# Patient Record
Sex: FEMALE | Race: BLACK OR AFRICAN AMERICAN | URBAN - METROPOLITAN AREA
[De-identification: names, ages, dates, MRNs, and addresses within clinical notes are randomized per-mention and may not be internally consistent; named-entity substitution may affect disease eponyms.]

---

## 2021-01-13 ENCOUNTER — TELEPHONE ENCOUNTER (OUTPATIENT)
Dept: URBAN - METROPOLITAN AREA CLINIC 113 | Facility: CLINIC | Age: 66
End: 2021-01-13

## 2021-03-16 ENCOUNTER — OFFICE VISIT (OUTPATIENT)
Dept: URBAN - METROPOLITAN AREA CLINIC 107 | Facility: CLINIC | Age: 66
End: 2021-03-16
Payer: MEDICARE

## 2021-03-16 ENCOUNTER — WEB ENCOUNTER (OUTPATIENT)
Dept: URBAN - METROPOLITAN AREA CLINIC 107 | Facility: CLINIC | Age: 66
End: 2021-03-16

## 2021-03-16 VITALS
RESPIRATION RATE: 18 BRPM | WEIGHT: 211 LBS | DIASTOLIC BLOOD PRESSURE: 73 MMHG | BODY MASS INDEX: 36.02 KG/M2 | SYSTOLIC BLOOD PRESSURE: 124 MMHG | HEIGHT: 64 IN | HEART RATE: 81 BPM | TEMPERATURE: 97.1 F

## 2021-03-16 DIAGNOSIS — R16.0 HEPATOMEGALY: ICD-10-CM

## 2021-03-16 DIAGNOSIS — K21.9 GASTROESOPHAGEAL REFLUX DISEASE, UNSPECIFIED WHETHER ESOPHAGITIS PRESENT: ICD-10-CM

## 2021-03-16 DIAGNOSIS — R10.11 RUQ PAIN: ICD-10-CM

## 2021-03-16 PROCEDURE — 99244 OFF/OP CNSLTJ NEW/EST MOD 40: CPT | Performed by: INTERNAL MEDICINE

## 2021-03-16 PROCEDURE — 99204 OFFICE O/P NEW MOD 45 MIN: CPT | Performed by: INTERNAL MEDICINE

## 2021-03-16 RX ORDER — AMLODIPINE BESYLATE 5 MG/1
1 TABLET TABLET ORAL ONCE A DAY
Status: ACTIVE | COMMUNITY

## 2021-03-16 RX ORDER — ESCITALOPRAM 20 MG/1
1 TABLET TABLET, FILM COATED ORAL ONCE A DAY
Status: ACTIVE | COMMUNITY

## 2021-03-16 RX ORDER — LEVOTHYROXINE SODIUM 0.03 MG/1
1 TABLET IN THE MORNING ON AN EMPTY STOMACH TABLET ORAL ONCE A DAY
Status: ACTIVE | COMMUNITY

## 2021-03-16 NOTE — HPI-TODAY'S VISIT:
66 y/o female with RUQ pain thats intermittent in nature. She has had this pain for > 2 years. It starts in her RUQ and sometimes radiates to the back. She does still have her GB. She was a previous smoker and drinks a beer occcasionally but stopped. She denies any nausea/emesis. She does have GERD. She does take OTC nexium and sometimes it helps.   She has had a colonoscopy last month by Dr. Mooney but was never treated for this pain by him. Her colonoscopy was positive for 2 polyps  and she was told to repeat in 5 years.

## 2021-05-12 ENCOUNTER — OFFICE VISIT (OUTPATIENT)
Dept: URBAN - METROPOLITAN AREA CLINIC 107 | Facility: CLINIC | Age: 66
End: 2021-05-12
Payer: MEDICARE

## 2021-05-12 VITALS
SYSTOLIC BLOOD PRESSURE: 147 MMHG | HEIGHT: 64 IN | DIASTOLIC BLOOD PRESSURE: 74 MMHG | TEMPERATURE: 98.1 F | WEIGHT: 214 LBS | HEART RATE: 82 BPM | RESPIRATION RATE: 18 BRPM | BODY MASS INDEX: 36.54 KG/M2

## 2021-05-12 DIAGNOSIS — R16.0 HEPATOMEGALY: ICD-10-CM

## 2021-05-12 DIAGNOSIS — R10.11 RUQ PAIN: ICD-10-CM

## 2021-05-12 DIAGNOSIS — K21.9 GASTROESOPHAGEAL REFLUX DISEASE, UNSPECIFIED WHETHER ESOPHAGITIS PRESENT: ICD-10-CM

## 2021-05-12 PROCEDURE — 99213 OFFICE O/P EST LOW 20 MIN: CPT | Performed by: INTERNAL MEDICINE

## 2021-05-12 RX ORDER — AMLODIPINE BESYLATE 5 MG/1
1 TABLET TABLET ORAL ONCE A DAY
Status: ACTIVE | COMMUNITY

## 2021-05-12 RX ORDER — BUPROPION HYDROCHLORIDE 150 MG/1
1 TABLET IN THE MORNING TABLET, FILM COATED ORAL ONCE A DAY
Status: ACTIVE | COMMUNITY

## 2021-05-12 RX ORDER — LEVOTHYROXINE SODIUM 0.03 MG/1
1 TABLET IN THE MORNING ON AN EMPTY STOMACH TABLET ORAL ONCE A DAY
Status: ACTIVE | COMMUNITY

## 2021-05-12 RX ORDER — ESCITALOPRAM 20 MG/1
1 TABLET TABLET, FILM COATED ORAL ONCE A DAY
Status: ON HOLD | COMMUNITY

## 2021-05-12 NOTE — HPI-TODAY'S VISIT:
66-year-old female presenting for follow-up.  She was last seen in the clinic on March 16, 2021.given her right upper quadrant abdominal pain we did schedule her for a CT scan abdomen and pelvis.  This was performed on March 22, 2021.  She was found have a small hiatal hernia, coronary artery calcifications, and a tiny fat umbilical hernia.  She also had severe spinal stenosis and multilevel degenerative changes in her spine.  3/16/2021 66 y/o female with RUQ pain thats intermittent in nature. She has had this pain for > 2 years. It starts in her RUQ and sometimes radiates to the back. She does still have her GB. She was a previous smoker and drinks a beer occcasionally but stopped. She denies any nausea/emesis. She does have GERD. She does take OTC nexium and sometimes it helps.   She has had a colonoscopy last month by Dr. Mooney but was never treated for this pain by him. Her colonoscopy was positive for 2 polyps  and she was told to repeat in 5 years.

## 2021-06-30 ENCOUNTER — OFFICE VISIT (OUTPATIENT)
Dept: URBAN - METROPOLITAN AREA SURGERY CENTER 25 | Facility: SURGERY CENTER | Age: 66
End: 2021-06-30
Payer: MEDICARE

## 2021-06-30 ENCOUNTER — OFFICE VISIT (OUTPATIENT)
Dept: URBAN - METROPOLITAN AREA SURGERY CENTER 25 | Facility: SURGERY CENTER | Age: 66
End: 2021-06-30

## 2021-06-30 ENCOUNTER — CLAIMS CREATED FROM THE CLAIM WINDOW (OUTPATIENT)
Dept: URBAN - METROPOLITAN AREA CLINIC 4 | Facility: CLINIC | Age: 66
End: 2021-06-30
Payer: MEDICARE

## 2021-06-30 DIAGNOSIS — R10.13 ABDOMINAL PAIN, EPIGASTRIC: ICD-10-CM

## 2021-06-30 DIAGNOSIS — K31.89 ACQUIRED DEFORMITY OF DUODENUM: ICD-10-CM

## 2021-06-30 DIAGNOSIS — R10.11 ABDOMINAL BURNING SENSATION IN RIGHT UPPER QUADRANT: ICD-10-CM

## 2021-06-30 DIAGNOSIS — K31.89 GASTRIC FOVEOLAR HYPERPLASIA: ICD-10-CM

## 2021-06-30 PROCEDURE — G8907 PT DOC NO EVENTS ON DISCHARG: HCPCS | Performed by: INTERNAL MEDICINE

## 2021-06-30 PROCEDURE — 88312 SPECIAL STAINS GROUP 1: CPT | Performed by: PATHOLOGY

## 2021-06-30 PROCEDURE — 43239 EGD BIOPSY SINGLE/MULTIPLE: CPT | Performed by: INTERNAL MEDICINE

## 2021-06-30 PROCEDURE — 88305 TISSUE EXAM BY PATHOLOGIST: CPT | Performed by: PATHOLOGY

## 2021-06-30 RX ORDER — LEVOTHYROXINE SODIUM 0.03 MG/1
1 TABLET IN THE MORNING ON AN EMPTY STOMACH TABLET ORAL ONCE A DAY
Status: ACTIVE | COMMUNITY

## 2021-06-30 RX ORDER — AMLODIPINE BESYLATE 5 MG/1
1 TABLET TABLET ORAL ONCE A DAY
Status: ACTIVE | COMMUNITY

## 2021-06-30 RX ORDER — ESCITALOPRAM 20 MG/1
1 TABLET TABLET, FILM COATED ORAL ONCE A DAY
Status: ON HOLD | COMMUNITY

## 2021-06-30 RX ORDER — BUPROPION HYDROCHLORIDE 150 MG/1
1 TABLET IN THE MORNING TABLET, FILM COATED ORAL ONCE A DAY
Status: ACTIVE | COMMUNITY

## 2021-08-27 ENCOUNTER — OFFICE VISIT (OUTPATIENT)
Dept: URBAN - METROPOLITAN AREA CLINIC 107 | Facility: CLINIC | Age: 66
End: 2021-08-27
Payer: MEDICARE

## 2021-08-27 VITALS
WEIGHT: 214 LBS | HEART RATE: 78 BPM | BODY MASS INDEX: 36.54 KG/M2 | SYSTOLIC BLOOD PRESSURE: 134 MMHG | HEIGHT: 64 IN | RESPIRATION RATE: 18 BRPM | TEMPERATURE: 98.7 F | DIASTOLIC BLOOD PRESSURE: 69 MMHG

## 2021-08-27 DIAGNOSIS — K21.9 GASTROESOPHAGEAL REFLUX DISEASE, UNSPECIFIED WHETHER ESOPHAGITIS PRESENT: ICD-10-CM

## 2021-08-27 DIAGNOSIS — R16.0 HEPATOMEGALY: ICD-10-CM

## 2021-08-27 DIAGNOSIS — R10.11 RUQ PAIN: ICD-10-CM

## 2021-08-27 PROCEDURE — 99214 OFFICE O/P EST MOD 30 MIN: CPT | Performed by: INTERNAL MEDICINE

## 2021-08-27 RX ORDER — ESCITALOPRAM 20 MG/1
1 TABLET TABLET, FILM COATED ORAL ONCE A DAY
Status: ACTIVE | COMMUNITY

## 2021-08-27 RX ORDER — LEVOTHYROXINE SODIUM 0.03 MG/1
1 TABLET IN THE MORNING ON AN EMPTY STOMACH TABLET ORAL ONCE A DAY
Status: ACTIVE | COMMUNITY

## 2021-08-27 RX ORDER — AMLODIPINE BESYLATE 5 MG/1
1 TABLET TABLET ORAL ONCE A DAY
Status: ACTIVE | COMMUNITY

## 2021-08-27 RX ORDER — BUPROPION HYDROCHLORIDE 150 MG/1
1 TABLET IN THE MORNING TABLET, FILM COATED ORAL ONCE A DAY
Status: ON HOLD | COMMUNITY

## 2021-08-27 NOTE — HPI-TODAY'S VISIT:
66-year-old female presenting for follow-up.  She was last seen in clinic on May 12, 2021.Since she has had an upper endoscopy performed for abdominal pain and suspected reflux. She was found have diffuse gastritis and a small hiatal hernia.She is here today for follow-up. She still has right flank pain under her ribcage. She has worse pain when she eats and at night.   She did have severe spinal stenosis on CT and has had back issues since the 80's. She has never had back surgery.   5/12/21 66-year-old female presenting for follow-up.  She was last seen in the clinic on March 16, 2021.given her right upper quadrant abdominal pain we did schedule her for a CT scan abdomen and pelvis.  This was performed on March 22, 2021.  She was found have a small hiatal hernia, coronary artery calcifications, and a tiny fat umbilical hernia.  She also had severe spinal stenosis and multilevel degenerative changes in her spine.  3/16/2021 64 y/o female with RUQ pain thats intermittent in nature. She has had this pain for > 2 years. It starts in her RUQ and sometimes radiates to the back. She does still have her GB. She was a previous smoker and drinks a beer occcasionally but stopped. She denies any nausea/emesis. She does have GERD. She does take OTC nexium and sometimes it helps.   She has had a colonoscopy last month by Dr. Mooney but was never treated for this pain by him. Her colonoscopy was positive for 2 polyps  and she was told to repeat in 5 years. 64 y/o female with RUQ pain thats intermittent in nature. She has had this pain for > 2 years. It starts in her RUQ and sometimes radiates to the back. She does still have her GB. She was a previous smoker and drinks a beer occcasionally but stopped. She denies any nausea/emesis. She does have GERD. She does take OTC nexium and sometimes it helps.   She has had a colonoscopy last month by Dr. Mooney but was never treated for this pain by him. Her colonoscopy was positive for 2 polyps  and she was told to repeat in 5 years.

## 2021-10-08 ENCOUNTER — OFFICE VISIT (OUTPATIENT)
Dept: URBAN - METROPOLITAN AREA CLINIC 107 | Facility: CLINIC | Age: 66
End: 2021-10-08

## 2021-10-22 ENCOUNTER — DASHBOARD ENCOUNTERS (OUTPATIENT)
Age: 66
End: 2021-10-22

## 2021-10-22 ENCOUNTER — OFFICE VISIT (OUTPATIENT)
Dept: URBAN - METROPOLITAN AREA CLINIC 107 | Facility: CLINIC | Age: 66
End: 2021-10-22
Payer: MEDICARE

## 2021-10-22 VITALS
RESPIRATION RATE: 20 BRPM | TEMPERATURE: 98.3 F | WEIGHT: 214 LBS | HEART RATE: 72 BPM | BODY MASS INDEX: 36.54 KG/M2 | HEIGHT: 64 IN | DIASTOLIC BLOOD PRESSURE: 66 MMHG | SYSTOLIC BLOOD PRESSURE: 141 MMHG

## 2021-10-22 DIAGNOSIS — R10.11 RUQ PAIN: ICD-10-CM

## 2021-10-22 DIAGNOSIS — K21.9 GASTROESOPHAGEAL REFLUX DISEASE, UNSPECIFIED WHETHER ESOPHAGITIS PRESENT: ICD-10-CM

## 2021-10-22 DIAGNOSIS — G89.29 OTHER CHRONIC PAIN: ICD-10-CM

## 2021-10-22 DIAGNOSIS — Z80.0 FAMILY HISTORY OF COLON CANCER: ICD-10-CM

## 2021-10-22 DIAGNOSIS — Z86.010 HISTORY OF COLON POLYPS: ICD-10-CM

## 2021-10-22 DIAGNOSIS — M54.50 LOW BACK PAIN, UNSPECIFIED: ICD-10-CM

## 2021-10-22 PROBLEM — 312824007: Status: ACTIVE | Noted: 2021-10-22

## 2021-10-22 PROBLEM — 428283002: Status: ACTIVE | Noted: 2021-10-22

## 2021-10-22 PROBLEM — 235595009: Status: ACTIVE | Noted: 2021-10-22

## 2021-10-22 PROBLEM — 301717006: Status: ACTIVE | Noted: 2021-10-22

## 2021-10-22 PROBLEM — 278860009: Status: ACTIVE | Noted: 2021-10-22

## 2021-10-22 PROBLEM — 82423001: Status: ACTIVE | Noted: 2021-10-22

## 2021-10-22 PROCEDURE — 99213 OFFICE O/P EST LOW 20 MIN: CPT | Performed by: NURSE PRACTITIONER

## 2021-10-22 RX ORDER — LEVOTHYROXINE SODIUM 0.03 MG/1
1 TABLET IN THE MORNING ON AN EMPTY STOMACH TABLET ORAL ONCE A DAY
Status: ACTIVE | COMMUNITY

## 2021-10-22 RX ORDER — AMLODIPINE BESYLATE 5 MG/1
1 TABLET TABLET ORAL ONCE A DAY
Status: ACTIVE | COMMUNITY

## 2021-10-22 RX ORDER — ESCITALOPRAM 20 MG/1
1 TABLET TABLET, FILM COATED ORAL ONCE A DAY
Status: ACTIVE | COMMUNITY

## 2021-10-22 RX ORDER — BUPROPION HYDROCHLORIDE 150 MG/1
1 TABLET IN THE MORNING TABLET, FILM COATED ORAL ONCE A DAY
Status: ON HOLD | COMMUNITY

## 2021-10-22 RX ORDER — CHOLECALCIFEROL (VITAMIN D3) 50 MCG
1 TABLET TABLET ORAL ONCE A DAY
Status: ACTIVE | COMMUNITY

## 2021-10-22 NOTE — HPI-OTHER HISTORIES
HIDA scan 10/8/2021:Patent cystic and common bile ducts.  Gallbladder ejection fraction 78%. EGD 6/30/2021:Normal esophagus, small hiatal hernia, gastritis, normal examined duodenum.  Stomach antral and body biopsies demonstrated chemical/reactive gastropathy. CT of the abdomen with contrast 3/22/2021:No acute abdominal abnormality identified.  Small hiatal hernia.  Coronary artery calcifications.  Moderate aortoiliac atherosclerotic calcifications.  Multilevel degenerative changes in the spine.  Prominent grade 1 anterior listhesis of L4 on L5 with severe spinal canal stenosis.  Tiny fatty umbilical hernia.

## 2021-10-22 NOTE — HPI-TODAY'S VISIT:
This is a 66-year-old female with a history of hypertension, hypothyroidism, severe spinal stenosis, colon polyps due surveillance in 2026 (Dr. Mooney), GERD, right upper quadrant pain, and hepatomegaly presenting for follow-up.  She was last seen 8/27/2021.  She had undergone an upper endoscopy notable for diffuse gastritis and a small hiatal hernia.  She reported persistent right flank pain beneath her rib cage exacerbated by eating and worse at night.  It was discussed that her pain was likely referred due to severe spinal stenosis.  A HIDA scan was ordered to rule out gallbladder pathology and labs were requested. Labs were not performed. She continues to experience pain indicated in the right lumbar area.  She  reports constant pain exacerbated by physical activity and position change. Tylenol is ineffective. She has infrequent heartburn and occasional diet dependent nausea.  If she is nauseated, she takes over-the-counter Nexium or Pepcid Complete which relieves her symptoms.  She requires this twice a week.  She has rare episodes of periumbilical pain.  She denies any other abdominal symptoms.  She plans to continue colonoscopy with Dr. Mooney due to the close proximity to her residence.

## 2021-10-22 NOTE — PHYSICAL EXAM MUSCULOSKELETAL:
Observed obvious discomfort with position changes - sitting to standing, standing to sitting, stepping onto exam table, sitting to supine, supine to sitting; slow and cautious position changes and gait